# Patient Record
Sex: MALE | Race: WHITE | Employment: OTHER | URBAN - METROPOLITAN AREA
[De-identification: names, ages, dates, MRNs, and addresses within clinical notes are randomized per-mention and may not be internally consistent; named-entity substitution may affect disease eponyms.]

---

## 2017-01-17 PROBLEM — Z90.2 HISTORY OF LOBECTOMY OF LUNG: Status: ACTIVE | Noted: 2017-01-17

## 2017-01-31 PROCEDURE — 87077 CULTURE AEROBIC IDENTIFY: CPT | Performed by: UROLOGY

## 2017-01-31 PROCEDURE — 87186 SC STD MICRODIL/AGAR DIL: CPT | Performed by: UROLOGY

## 2017-01-31 PROCEDURE — 87086 URINE CULTURE/COLONY COUNT: CPT | Performed by: UROLOGY

## 2017-02-14 PROCEDURE — 87186 SC STD MICRODIL/AGAR DIL: CPT | Performed by: UROLOGY

## 2017-02-14 PROCEDURE — 87077 CULTURE AEROBIC IDENTIFY: CPT | Performed by: UROLOGY

## 2017-02-14 PROCEDURE — 87086 URINE CULTURE/COLONY COUNT: CPT | Performed by: UROLOGY

## 2017-05-09 PROCEDURE — 87086 URINE CULTURE/COLONY COUNT: CPT | Performed by: UROLOGY

## 2017-05-09 PROCEDURE — 87186 SC STD MICRODIL/AGAR DIL: CPT | Performed by: UROLOGY

## 2017-05-09 PROCEDURE — 87077 CULTURE AEROBIC IDENTIFY: CPT | Performed by: UROLOGY

## 2017-05-25 PROBLEM — N20.0 KIDNEY STONE ON LEFT SIDE: Status: ACTIVE | Noted: 2017-05-25

## 2017-06-01 PROCEDURE — 87086 URINE CULTURE/COLONY COUNT: CPT | Performed by: UROLOGY

## 2017-06-01 PROCEDURE — 87077 CULTURE AEROBIC IDENTIFY: CPT | Performed by: UROLOGY

## 2017-06-01 PROCEDURE — 87186 SC STD MICRODIL/AGAR DIL: CPT | Performed by: UROLOGY

## 2017-06-21 PROBLEM — N20.1 LEFT URETERAL STONE: Status: ACTIVE | Noted: 2017-06-21

## 2017-06-21 PROBLEM — N40.1 BENIGN NON-NODULAR PROSTATIC HYPERPLASIA WITH LOWER URINARY TRACT SYMPTOMS: Status: ACTIVE | Noted: 2017-06-21

## 2017-06-21 PROBLEM — R33.9 INCOMPLETE BLADDER EMPTYING: Status: ACTIVE | Noted: 2017-06-21

## 2017-07-31 PROCEDURE — 82365 CALCULUS SPECTROSCOPY: CPT | Performed by: UROLOGY

## 2017-09-18 PROCEDURE — 82436 ASSAY OF URINE CHLORIDE: CPT | Performed by: UROLOGY

## 2017-09-18 PROCEDURE — 84392 ASSAY OF URINE SULFATE: CPT | Performed by: UROLOGY

## 2017-09-18 PROCEDURE — 82507 ASSAY OF CITRATE: CPT | Performed by: UROLOGY

## 2017-09-18 PROCEDURE — 83945 ASSAY OF OXALATE: CPT | Performed by: UROLOGY

## 2017-09-18 PROCEDURE — 82340 ASSAY OF CALCIUM IN URINE: CPT | Performed by: UROLOGY

## 2018-01-18 PROBLEM — N20.1 LEFT URETERAL STONE: Status: RESOLVED | Noted: 2017-06-21 | Resolved: 2018-01-18

## 2018-08-29 PROBLEM — Z87.442 PERSONAL HISTORY OF KIDNEY STONES: Status: ACTIVE | Noted: 2018-08-29

## 2018-08-29 PROBLEM — N52.02 CORPORO-VENOUS OCCLUSIVE ERECTILE DYSFUNCTION: Status: ACTIVE | Noted: 2018-08-29

## 2019-02-25 PROBLEM — Z85.118 HISTORY OF LUNG CANCER: Status: ACTIVE | Noted: 2019-02-25

## 2019-02-25 PROCEDURE — 81001 URINALYSIS AUTO W/SCOPE: CPT | Performed by: INTERNAL MEDICINE

## 2019-02-28 RX ORDER — SODIUM CHLORIDE, SODIUM LACTATE, POTASSIUM CHLORIDE, CALCIUM CHLORIDE 600; 310; 30; 20 MG/100ML; MG/100ML; MG/100ML; MG/100ML
INJECTION, SOLUTION INTRAVENOUS CONTINUOUS
Status: CANCELLED | OUTPATIENT
Start: 2019-02-28

## 2019-03-02 PROCEDURE — 87086 URINE CULTURE/COLONY COUNT: CPT | Performed by: INTERNAL MEDICINE

## 2019-03-02 PROCEDURE — 81001 URINALYSIS AUTO W/SCOPE: CPT | Performed by: INTERNAL MEDICINE

## 2019-03-04 ENCOUNTER — ANESTHESIA EVENT (OUTPATIENT)
Dept: ENDOSCOPY | Facility: HOSPITAL | Age: 74
End: 2019-03-04

## 2019-03-04 ENCOUNTER — HOSPITAL ENCOUNTER (OUTPATIENT)
Facility: HOSPITAL | Age: 74
Setting detail: HOSPITAL OUTPATIENT SURGERY
Discharge: HOME | End: 2019-03-04
Attending: INTERNAL MEDICINE | Admitting: INTERNAL MEDICINE
Payer: MEDICARE

## 2019-03-04 ENCOUNTER — ANESTHESIA (OUTPATIENT)
Dept: ENDOSCOPY | Facility: HOSPITAL | Age: 74
End: 2019-03-04

## 2019-03-04 VITALS
OXYGEN SATURATION: 99 % | HEIGHT: 73 IN | WEIGHT: 195 LBS | DIASTOLIC BLOOD PRESSURE: 96 MMHG | RESPIRATION RATE: 18 BRPM | TEMPERATURE: 99 F | SYSTOLIC BLOOD PRESSURE: 145 MMHG | HEART RATE: 96 BPM | BODY MASS INDEX: 25.84 KG/M2

## 2019-03-04 DIAGNOSIS — R93.3 ABNORMAL CT SCAN, COLON: ICD-10-CM

## 2019-03-04 PROCEDURE — 0DBP8ZX EXCISION OF RECTUM, VIA NATURAL OR ARTIFICIAL OPENING ENDOSCOPIC, DIAGNOSTIC: ICD-10-PCS | Performed by: INTERNAL MEDICINE

## 2019-03-04 PROCEDURE — 88305 TISSUE EXAM BY PATHOLOGIST: CPT | Performed by: INTERNAL MEDICINE

## 2019-03-04 PROCEDURE — 0DBH8ZX EXCISION OF CECUM, VIA NATURAL OR ARTIFICIAL OPENING ENDOSCOPIC, DIAGNOSTIC: ICD-10-PCS | Performed by: INTERNAL MEDICINE

## 2019-03-04 RX ORDER — NALOXONE HYDROCHLORIDE 0.4 MG/ML
80 INJECTION, SOLUTION INTRAMUSCULAR; INTRAVENOUS; SUBCUTANEOUS AS NEEDED
Status: DISCONTINUED | OUTPATIENT
Start: 2019-03-04 | End: 2019-03-04

## 2019-03-04 RX ORDER — SODIUM CHLORIDE, SODIUM LACTATE, POTASSIUM CHLORIDE, CALCIUM CHLORIDE 600; 310; 30; 20 MG/100ML; MG/100ML; MG/100ML; MG/100ML
INJECTION, SOLUTION INTRAVENOUS CONTINUOUS
Status: DISCONTINUED | OUTPATIENT
Start: 2019-03-04 | End: 2019-03-04

## 2019-03-04 RX ORDER — HYDROMORPHONE HYDROCHLORIDE 1 MG/ML
0.4 INJECTION, SOLUTION INTRAMUSCULAR; INTRAVENOUS; SUBCUTANEOUS EVERY 5 MIN PRN
Status: DISCONTINUED | OUTPATIENT
Start: 2019-03-04 | End: 2019-03-04

## 2019-03-04 NOTE — OPERATIVE REPORT
[id number]  Mamadou Uzair Pandamon  5/28/1945  3/4/2019      Preoperative Diagnosis:   Cecal mass seen on CT scan, no prior history of colonoscopy.   Postoperative Diagnosis: Large cecal mass, significant left-sided diverticulosis, rectal polyps  Procedure Perform

## 2019-03-04 NOTE — ANESTHESIA PREPROCEDURE EVALUATION
PRE-OP EVALUATION    Patient Name: Tom Reilly    Pre-op Diagnosis: Abnormal CT scan, colon [R93.3]    Procedure(s):  COLONOSCOPY    Surgeon(s) and Role:     Kirk Villaseñor MD - Primary    Pre-op vitals reviewed.   Temp: 98.5 °F (36.9 °C)  Pulse: 9 prostatic hyperplasia with lower urinary tract symptoms     Incomplete bladder emptying     Personal history of kidney stones     Corporo-venous occlusive erectile dysfunction     History of lung cancer          Past Surgical History:   Procedure Lateralit Component Value Date     02/25/2019    K 4.30 02/25/2019     02/25/2019    CO2 25.2 02/25/2019    BUN 15 02/25/2019    CREATSERUM 0.84 02/25/2019    GLU 97 02/25/2019            Airway      Mallampati: I  Mouth opening: 3 FB  TM distance: 4 -

## 2019-03-04 NOTE — H&P
[address] Department of  Gastroenterology  Update Health History :       Marietta Randi  male   Gilberto Skiff, MD     [id number]  5/28/1945 Primary Care Physician  Heidy Cannon MD        HPI :  Patient with a history of lung cancer. SURGICAL HISTORY  08/23/2017    cysto, stent removal dr Yesenia Cano   • OTHER SURGICAL HISTORY  08/30/2017    cysto, left urs, stone extraction, laser litho, stent    • OTHER SURGICAL HISTORY  09/07/2017    cysto, stent removal dr Celena Ruth    • [address] murmur   GI: good BS's and no masses, HSM or tenderness   RECTAL: Exam not done. EXTREMITIES: no cyanosis,   NEURO: Oriented times three,  grossly intact      Assessment :  As above    Plan:   Colonoscopy.   The risks and benefits of the procedure were [facility]

## 2019-03-04 NOTE — ANESTHESIA POSTPROCEDURE EVALUATION
[id number] [facility] Patient Status:  Hospital Outpatient Surgery   Age/Gender 68year old male MRN [id number]   Location [facility]. Attending Sukh Haines MD   Hosp Day # 0 PCP Terrell Jaime MD       Anesthesia Po

## 2019-04-02 PROBLEM — C18.2 MALIGNANT NEOPLASM OF ASCENDING COLON (HCC): Status: ACTIVE | Noted: 2019-04-02

## 2019-04-17 PROBLEM — N21.0 BLADDER STONE: Status: ACTIVE | Noted: 2019-04-17

## 2019-04-17 PROBLEM — I70.0 THORACIC AORTIC ATHEROSCLEROSIS (HCC): Status: ACTIVE | Noted: 2019-04-17

## 2019-07-23 PROBLEM — Z51.11 CHEMOTHERAPY MANAGEMENT, ENCOUNTER FOR: Status: ACTIVE | Noted: 2019-07-23

## 2019-07-23 PROBLEM — T45.1X5A CHEMOTHERAPY-INDUCED NEUTROPENIA (HCC): Status: ACTIVE | Noted: 2019-07-23

## 2019-07-23 PROBLEM — D70.1 CHEMOTHERAPY-INDUCED NEUTROPENIA (HCC): Status: ACTIVE | Noted: 2019-07-23

## 2020-02-28 PROBLEM — G62.0 NEUROPATHY DUE TO CHEMOTHERAPEUTIC DRUG (HCC): Status: ACTIVE | Noted: 2020-02-28

## 2020-02-28 PROBLEM — J84.10 CALCIFIED GRANULOMA OF LUNG (HCC): Status: ACTIVE | Noted: 2020-02-28

## 2020-02-28 PROBLEM — T45.1X5A NEUROPATHY DUE TO CHEMOTHERAPEUTIC DRUG (HCC): Status: ACTIVE | Noted: 2020-02-28

## 2020-05-06 PROBLEM — I70.0 CALCIFICATION OF AORTA (HCC): Status: ACTIVE | Noted: 2020-05-06

## 2021-07-02 PROBLEM — D70.1 CHEMOTHERAPY-INDUCED NEUTROPENIA (HCC): Status: RESOLVED | Noted: 2019-07-23 | Resolved: 2021-07-02

## 2021-07-02 PROBLEM — D69.2 SENILE PURPURA (HCC): Status: ACTIVE | Noted: 2021-07-02

## 2021-07-02 PROBLEM — Z51.11 CHEMOTHERAPY MANAGEMENT, ENCOUNTER FOR: Status: RESOLVED | Noted: 2019-07-23 | Resolved: 2021-07-02

## 2021-07-02 PROBLEM — T45.1X5A CHEMOTHERAPY-INDUCED NEUTROPENIA (HCC): Status: RESOLVED | Noted: 2019-07-23 | Resolved: 2021-07-02

## 2022-03-11 PROBLEM — M17.0 BILATERAL PRIMARY OSTEOARTHRITIS OF KNEE: Status: ACTIVE | Noted: 2022-03-11

## 2023-04-04 ENCOUNTER — LAB ENCOUNTER (OUTPATIENT)
Dept: LAB | Age: 78
End: 2023-04-04
Attending: ORTHOPAEDIC SURGERY
Payer: MEDICARE

## 2023-04-04 DIAGNOSIS — Z01.818 PREOP TESTING: ICD-10-CM

## 2023-04-04 LAB
ANTIBODY SCREEN: NEGATIVE
RH BLOOD TYPE: NEGATIVE
RH BLOOD TYPE: NEGATIVE

## 2023-04-04 PROCEDURE — 36415 COLL VENOUS BLD VENIPUNCTURE: CPT

## 2023-04-04 PROCEDURE — 87641 MR-STAPH DNA AMP PROBE: CPT

## 2023-04-04 PROCEDURE — 86901 BLOOD TYPING SEROLOGIC RH(D): CPT

## 2023-04-04 PROCEDURE — 86850 RBC ANTIBODY SCREEN: CPT

## 2023-04-04 PROCEDURE — 86900 BLOOD TYPING SEROLOGIC ABO: CPT

## 2023-04-05 LAB — MRSA DNA SPEC QL NAA+PROBE: NEGATIVE

## 2023-04-06 ENCOUNTER — HOSPITAL ENCOUNTER (OUTPATIENT)
Facility: HOSPITAL | Age: 78
Discharge: HOME HEALTH | End: 2023-04-07
Attending: ORTHOPAEDIC SURGERY | Admitting: ORTHOPAEDIC SURGERY
Payer: MEDICARE

## 2023-04-06 ENCOUNTER — APPOINTMENT (OUTPATIENT)
Dept: GENERAL RADIOLOGY | Facility: HOSPITAL | Age: 78
End: 2023-04-06
Attending: PHYSICIAN ASSISTANT
Payer: MEDICARE

## 2023-04-06 ENCOUNTER — ANESTHESIA (OUTPATIENT)
Dept: SURGERY | Facility: HOSPITAL | Age: 78
End: 2023-04-06
Payer: MEDICARE

## 2023-04-06 ENCOUNTER — ANESTHESIA EVENT (OUTPATIENT)
Dept: SURGERY | Facility: HOSPITAL | Age: 78
End: 2023-04-06
Payer: MEDICARE

## 2023-04-06 DIAGNOSIS — Z01.818 PREOP TESTING: Primary | ICD-10-CM

## 2023-04-06 PROBLEM — Z96.652 S/P TOTAL KNEE ARTHROPLASTY, LEFT: Status: ACTIVE | Noted: 2023-04-06

## 2023-04-06 PROCEDURE — 88311 DECALCIFY TISSUE: CPT | Performed by: ORTHOPAEDIC SURGERY

## 2023-04-06 PROCEDURE — 0SRD069 REPLACEMENT OF LEFT KNEE JOINT WITH OXIDIZED ZIRCONIUM ON POLYETHYLENE SYNTHETIC SUBSTITUTE, CEMENTED, OPEN APPROACH: ICD-10-PCS | Performed by: ORTHOPAEDIC SURGERY

## 2023-04-06 PROCEDURE — 88305 TISSUE EXAM BY PATHOLOGIST: CPT | Performed by: ORTHOPAEDIC SURGERY

## 2023-04-06 PROCEDURE — 73560 X-RAY EXAM OF KNEE 1 OR 2: CPT | Performed by: PHYSICIAN ASSISTANT

## 2023-04-06 PROCEDURE — 3E0T3BZ INTRODUCTION OF ANESTHETIC AGENT INTO PERIPHERAL NERVES AND PLEXI, PERCUTANEOUS APPROACH: ICD-10-PCS | Performed by: ANESTHESIOLOGY

## 2023-04-06 PROCEDURE — 64447 NJX AA&/STRD FEMORAL NRV IMG: CPT | Performed by: ORTHOPAEDIC SURGERY

## 2023-04-06 DEVICE — PSN ALL POLY PAT PLY 38MM: Type: IMPLANTABLE DEVICE | Site: KNEE | Status: FUNCTIONAL

## 2023-04-06 DEVICE — PSN TIB STM 5 DEG SZ F L: Type: IMPLANTABLE DEVICE | Site: KNEE | Status: FUNCTIONAL

## 2023-04-06 DEVICE — PSN MC VE ASF L 10MM 8-9/CD: Type: IMPLANTABLE DEVICE | Site: KNEE | Status: FUNCTIONAL

## 2023-04-06 DEVICE — BIOMET BC R 1X40 US: Type: IMPLANTABLE DEVICE | Site: KNEE | Status: FUNCTIONAL

## 2023-04-06 RX ORDER — HYDROMORPHONE HYDROCHLORIDE 1 MG/ML
0.2 INJECTION, SOLUTION INTRAMUSCULAR; INTRAVENOUS; SUBCUTANEOUS EVERY 5 MIN PRN
Status: DISCONTINUED | OUTPATIENT
Start: 2023-04-06 | End: 2023-04-06 | Stop reason: HOSPADM

## 2023-04-06 RX ORDER — FAMOTIDINE 10 MG/ML
20 INJECTION, SOLUTION INTRAVENOUS 2 TIMES DAILY
Status: DISCONTINUED | OUTPATIENT
Start: 2023-04-06 | End: 2023-04-07

## 2023-04-06 RX ORDER — HYDROCODONE BITARTRATE AND ACETAMINOPHEN 10; 325 MG/1; MG/1
1 TABLET ORAL EVERY 6 HOURS PRN
Status: DISCONTINUED | OUTPATIENT
Start: 2023-04-06 | End: 2023-04-07

## 2023-04-06 RX ORDER — DEXAMETHASONE SODIUM PHOSPHATE 10 MG/ML
INJECTION, SOLUTION INTRAMUSCULAR; INTRAVENOUS AS NEEDED
Status: DISCONTINUED | OUTPATIENT
Start: 2023-04-06 | End: 2023-04-06 | Stop reason: SURG

## 2023-04-06 RX ORDER — METOCLOPRAMIDE HYDROCHLORIDE 5 MG/ML
10 INJECTION INTRAMUSCULAR; INTRAVENOUS EVERY 8 HOURS PRN
Status: DISCONTINUED | OUTPATIENT
Start: 2023-04-06 | End: 2023-04-07

## 2023-04-06 RX ORDER — BUPIVACAINE HYDROCHLORIDE 7.5 MG/ML
INJECTION, SOLUTION INTRASPINAL AS NEEDED
Status: DISCONTINUED | OUTPATIENT
Start: 2023-04-06 | End: 2023-04-06 | Stop reason: SURG

## 2023-04-06 RX ORDER — DIPHENHYDRAMINE HYDROCHLORIDE 50 MG/ML
25 INJECTION INTRAMUSCULAR; INTRAVENOUS ONCE AS NEEDED
Status: ACTIVE | OUTPATIENT
Start: 2023-04-06 | End: 2023-04-06

## 2023-04-06 RX ORDER — MORPHINE SULFATE 4 MG/ML
2 INJECTION, SOLUTION INTRAMUSCULAR; INTRAVENOUS EVERY 10 MIN PRN
Status: DISCONTINUED | OUTPATIENT
Start: 2023-04-06 | End: 2023-04-06 | Stop reason: HOSPADM

## 2023-04-06 RX ORDER — SODIUM CHLORIDE, SODIUM LACTATE, POTASSIUM CHLORIDE, CALCIUM CHLORIDE 600; 310; 30; 20 MG/100ML; MG/100ML; MG/100ML; MG/100ML
INJECTION, SOLUTION INTRAVENOUS CONTINUOUS
Status: DISCONTINUED | OUTPATIENT
Start: 2023-04-06 | End: 2023-04-06 | Stop reason: HOSPADM

## 2023-04-06 RX ORDER — FAMOTIDINE 20 MG/1
20 TABLET, FILM COATED ORAL 2 TIMES DAILY
Status: DISCONTINUED | OUTPATIENT
Start: 2023-04-06 | End: 2023-04-07

## 2023-04-06 RX ORDER — HYDROMORPHONE HYDROCHLORIDE 1 MG/ML
0.6 INJECTION, SOLUTION INTRAMUSCULAR; INTRAVENOUS; SUBCUTANEOUS EVERY 5 MIN PRN
Status: DISCONTINUED | OUTPATIENT
Start: 2023-04-06 | End: 2023-04-06 | Stop reason: HOSPADM

## 2023-04-06 RX ORDER — TRANEXAMIC ACID 10 MG/ML
INJECTION, SOLUTION INTRAVENOUS AS NEEDED
Status: DISCONTINUED | OUTPATIENT
Start: 2023-04-06 | End: 2023-04-06 | Stop reason: SURG

## 2023-04-06 RX ORDER — LIDOCAINE HYDROCHLORIDE 10 MG/ML
INJECTION, SOLUTION EPIDURAL; INFILTRATION; INTRACAUDAL; PERINEURAL AS NEEDED
Status: DISCONTINUED | OUTPATIENT
Start: 2023-04-06 | End: 2023-04-06 | Stop reason: SURG

## 2023-04-06 RX ORDER — SODIUM CHLORIDE, SODIUM LACTATE, POTASSIUM CHLORIDE, CALCIUM CHLORIDE 600; 310; 30; 20 MG/100ML; MG/100ML; MG/100ML; MG/100ML
INJECTION, SOLUTION INTRAVENOUS CONTINUOUS
Status: DISCONTINUED | OUTPATIENT
Start: 2023-04-06 | End: 2023-04-07

## 2023-04-06 RX ORDER — ATORVASTATIN CALCIUM 10 MG/1
10 TABLET, FILM COATED ORAL NIGHTLY
Status: DISCONTINUED | OUTPATIENT
Start: 2023-04-06 | End: 2023-04-07

## 2023-04-06 RX ORDER — HYDROMORPHONE HYDROCHLORIDE 1 MG/ML
0.4 INJECTION, SOLUTION INTRAMUSCULAR; INTRAVENOUS; SUBCUTANEOUS EVERY 5 MIN PRN
Status: DISCONTINUED | OUTPATIENT
Start: 2023-04-06 | End: 2023-04-06 | Stop reason: HOSPADM

## 2023-04-06 RX ORDER — SODIUM PHOSPHATE, DIBASIC AND SODIUM PHOSPHATE, MONOBASIC 7; 19 G/133ML; G/133ML
1 ENEMA RECTAL ONCE AS NEEDED
Status: DISCONTINUED | OUTPATIENT
Start: 2023-04-06 | End: 2023-04-07

## 2023-04-06 RX ORDER — MORPHINE SULFATE 10 MG/ML
6 INJECTION, SOLUTION INTRAMUSCULAR; INTRAVENOUS EVERY 10 MIN PRN
Status: DISCONTINUED | OUTPATIENT
Start: 2023-04-06 | End: 2023-04-06 | Stop reason: HOSPADM

## 2023-04-06 RX ORDER — NALOXONE HYDROCHLORIDE 0.4 MG/ML
80 INJECTION, SOLUTION INTRAMUSCULAR; INTRAVENOUS; SUBCUTANEOUS AS NEEDED
Status: DISCONTINUED | OUTPATIENT
Start: 2023-04-06 | End: 2023-04-06 | Stop reason: HOSPADM

## 2023-04-06 RX ORDER — POLYETHYLENE GLYCOL 3350 17 G/17G
17 POWDER, FOR SOLUTION ORAL DAILY PRN
Status: DISCONTINUED | OUTPATIENT
Start: 2023-04-06 | End: 2023-04-07

## 2023-04-06 RX ORDER — DIPHENHYDRAMINE HYDROCHLORIDE 50 MG/ML
12.5 INJECTION INTRAMUSCULAR; INTRAVENOUS EVERY 4 HOURS PRN
Status: DISCONTINUED | OUTPATIENT
Start: 2023-04-06 | End: 2023-04-07

## 2023-04-06 RX ORDER — MIDAZOLAM HYDROCHLORIDE 1 MG/ML
INJECTION INTRAMUSCULAR; INTRAVENOUS AS NEEDED
Status: DISCONTINUED | OUTPATIENT
Start: 2023-04-06 | End: 2023-04-06 | Stop reason: SURG

## 2023-04-06 RX ORDER — DIPHENHYDRAMINE HCL 25 MG
25 CAPSULE ORAL EVERY 4 HOURS PRN
Status: DISCONTINUED | OUTPATIENT
Start: 2023-04-06 | End: 2023-04-07

## 2023-04-06 RX ORDER — METOCLOPRAMIDE HYDROCHLORIDE 5 MG/ML
10 INJECTION INTRAMUSCULAR; INTRAVENOUS EVERY 8 HOURS PRN
Status: DISCONTINUED | OUTPATIENT
Start: 2023-04-06 | End: 2023-04-06 | Stop reason: HOSPADM

## 2023-04-06 RX ORDER — FINASTERIDE 5 MG/1
5 TABLET, FILM COATED ORAL NIGHTLY
Status: DISCONTINUED | OUTPATIENT
Start: 2023-04-06 | End: 2023-04-07

## 2023-04-06 RX ORDER — CEFAZOLIN SODIUM/WATER 2 G/20 ML
2 SYRINGE (ML) INTRAVENOUS ONCE
Status: DISCONTINUED | OUTPATIENT
Start: 2023-04-06 | End: 2023-04-06 | Stop reason: HOSPADM

## 2023-04-06 RX ORDER — DOCUSATE SODIUM 100 MG/1
100 CAPSULE, LIQUID FILLED ORAL 2 TIMES DAILY
Status: DISCONTINUED | OUTPATIENT
Start: 2023-04-06 | End: 2023-04-07

## 2023-04-06 RX ORDER — CEFAZOLIN SODIUM/WATER 2 G/20 ML
SYRINGE (ML) INTRAVENOUS AS NEEDED
Status: DISCONTINUED | OUTPATIENT
Start: 2023-04-06 | End: 2023-04-06 | Stop reason: SURG

## 2023-04-06 RX ORDER — ONDANSETRON 2 MG/ML
4 INJECTION INTRAMUSCULAR; INTRAVENOUS EVERY 6 HOURS PRN
Status: DISCONTINUED | OUTPATIENT
Start: 2023-04-06 | End: 2023-04-07

## 2023-04-06 RX ORDER — LISINOPRIL 10 MG/1
10 TABLET ORAL DAILY
Status: DISCONTINUED | OUTPATIENT
Start: 2023-04-07 | End: 2023-04-07

## 2023-04-06 RX ORDER — TAMSULOSIN HYDROCHLORIDE 0.4 MG/1
0.4 CAPSULE ORAL 2 TIMES DAILY
Status: DISCONTINUED | OUTPATIENT
Start: 2023-04-06 | End: 2023-04-07

## 2023-04-06 RX ORDER — ACETAMINOPHEN 500 MG
1000 TABLET ORAL ONCE
Status: COMPLETED | OUTPATIENT
Start: 2023-04-06 | End: 2023-04-06

## 2023-04-06 RX ORDER — TRANEXAMIC ACID 650 MG/1
1300 TABLET ORAL ONCE
Status: DISCONTINUED | OUTPATIENT
Start: 2023-04-06 | End: 2023-04-06 | Stop reason: HOSPADM

## 2023-04-06 RX ORDER — METOPROLOL SUCCINATE 25 MG/1
25 TABLET, EXTENDED RELEASE ORAL DAILY
Status: DISCONTINUED | OUTPATIENT
Start: 2023-04-07 | End: 2023-04-07

## 2023-04-06 RX ORDER — ROPIVACAINE HYDROCHLORIDE 5 MG/ML
INJECTION, SOLUTION EPIDURAL; INFILTRATION; PERINEURAL AS NEEDED
Status: DISCONTINUED | OUTPATIENT
Start: 2023-04-06 | End: 2023-04-06 | Stop reason: SURG

## 2023-04-06 RX ORDER — HYDRALAZINE HYDROCHLORIDE 20 MG/ML
5 INJECTION INTRAMUSCULAR; INTRAVENOUS EVERY 6 HOURS PRN
Status: DISCONTINUED | OUTPATIENT
Start: 2023-04-06 | End: 2023-04-07

## 2023-04-06 RX ORDER — MORPHINE SULFATE 4 MG/ML
4 INJECTION, SOLUTION INTRAMUSCULAR; INTRAVENOUS EVERY 10 MIN PRN
Status: DISCONTINUED | OUTPATIENT
Start: 2023-04-06 | End: 2023-04-06 | Stop reason: HOSPADM

## 2023-04-06 RX ORDER — BISACODYL 10 MG
10 SUPPOSITORY, RECTAL RECTAL
Status: DISCONTINUED | OUTPATIENT
Start: 2023-04-06 | End: 2023-04-07

## 2023-04-06 RX ORDER — ONDANSETRON 2 MG/ML
4 INJECTION INTRAMUSCULAR; INTRAVENOUS EVERY 6 HOURS PRN
Status: DISCONTINUED | OUTPATIENT
Start: 2023-04-06 | End: 2023-04-06 | Stop reason: HOSPADM

## 2023-04-06 RX ORDER — LISINOPRIL 10 MG/1
10 TABLET ORAL ONCE
Status: COMPLETED | OUTPATIENT
Start: 2023-04-06 | End: 2023-04-06

## 2023-04-06 RX ORDER — SENNOSIDES 8.6 MG
17.2 TABLET ORAL NIGHTLY
Status: DISCONTINUED | OUTPATIENT
Start: 2023-04-06 | End: 2023-04-07

## 2023-04-06 RX ORDER — CEFAZOLIN SODIUM/WATER 2 G/20 ML
2 SYRINGE (ML) INTRAVENOUS EVERY 8 HOURS
Status: COMPLETED | OUTPATIENT
Start: 2023-04-06 | End: 2023-04-07

## 2023-04-06 RX ADMIN — ROPIVACAINE HYDROCHLORIDE 25 ML: 5 INJECTION, SOLUTION EPIDURAL; INFILTRATION; PERINEURAL at 14:28:00

## 2023-04-06 RX ADMIN — SODIUM CHLORIDE, SODIUM LACTATE, POTASSIUM CHLORIDE, CALCIUM CHLORIDE: 600; 310; 30; 20 INJECTION, SOLUTION INTRAVENOUS at 14:36:00

## 2023-04-06 RX ADMIN — MIDAZOLAM HYDROCHLORIDE 2 MG: 1 INJECTION INTRAMUSCULAR; INTRAVENOUS at 14:24:00

## 2023-04-06 RX ADMIN — TRANEXAMIC ACID 1000 MG: 10 INJECTION, SOLUTION INTRAVENOUS at 15:07:00

## 2023-04-06 RX ADMIN — CEFAZOLIN SODIUM/WATER 2 G: 2 G/20 ML SYRINGE (ML) INTRAVENOUS at 14:51:00

## 2023-04-06 RX ADMIN — LIDOCAINE HYDROCHLORIDE 2 ML: 10 INJECTION, SOLUTION EPIDURAL; INFILTRATION; INTRACAUDAL; PERINEURAL at 14:43:00

## 2023-04-06 RX ADMIN — BUPIVACAINE HYDROCHLORIDE 1.5 ML: 7.5 INJECTION, SOLUTION INTRASPINAL at 14:45:00

## 2023-04-06 RX ADMIN — LIDOCAINE HYDROCHLORIDE 5 ML: 10 INJECTION, SOLUTION EPIDURAL; INFILTRATION; INTRACAUDAL; PERINEURAL at 14:26:00

## 2023-04-06 RX ADMIN — LIDOCAINE HYDROCHLORIDE 3 ML: 10 INJECTION, SOLUTION EPIDURAL; INFILTRATION; INTRACAUDAL; PERINEURAL at 14:47:00

## 2023-04-06 RX ADMIN — DEXAMETHASONE SODIUM PHOSPHATE 4 MG: 10 INJECTION, SOLUTION INTRAMUSCULAR; INTRAVENOUS at 14:28:00

## 2023-04-06 RX ADMIN — SODIUM CHLORIDE, SODIUM LACTATE, POTASSIUM CHLORIDE, CALCIUM CHLORIDE: 600; 310; 30; 20 INJECTION, SOLUTION INTRAVENOUS at 16:25:00

## 2023-04-06 NOTE — PLAN OF CARE
Problem: Patient Centered Care  Goal: Patient preferences are identified and integrated in the patient's plan of care  Description: Interventions:  - What would you like us to know as we care for you?   - Provide timely, complete, and accurate information to patient/family  - Incorporate patient and family knowledge, values, beliefs, and cultural backgrounds into the planning and delivery of care  - Encourage patient/family to participate in care and decision-making at the level they choose  - Honor patient and family perspectives and choices  Outcome: Progressing

## 2023-04-06 NOTE — DISCHARGE INSTRUCTIONS
Keep dressing intact for 1 week, changing only if >50% saturated  Change dressing in 1 week if not previously changed  May shower with water-proof dressing intact  Keep leg elevated when not ambulating, no pillow directly under knee, keep leg straight with foot higher than knee. Use pain medication as provided  Use over the counter stool softener daily while taking pain medication - Colace 100mg twice a day and Senokot 17.2mg every night. If no bowel movement in 2 days, obtain Magnesium Citrate and take as directed.   Continue Xarelto 20mg daily per regular dosing  Contact office in 24 hours if you have not heard from  for RN and physical therapy home visits  Follow-up in office in 2 weeks as scheduled      [address]  Phone: [phone]

## 2023-04-06 NOTE — ANESTHESIA PROCEDURE NOTES
Spinal Block    Date/Time: 4/6/2023 2:39 PM    Performed by: José Miguel Mobley DO  Authorized by: José Miguel Mobley DO      General Information and Staff    Start Time:  4/6/2023 2:39 PM  End Time:  4/6/2023 2:47 PM  Anesthesiologist:  José Miguel Mobley DO  Performed by:   Anesthesiologist  Patient Location:  OR  Reason for Block: surgical anesthesia    Preanesthetic Checklist: patient identified, IV checked, risks and benefits discussed, monitors and equipment checked, pre-op evaluation, timeout performed, anesthesia consent and sterile technique used      Procedure Details    Patient Position:  Sitting  Prep: ChloraPrep    Monitoring:  Cardiac monitor  Approach:  Midline  Location:  L3-4  Injection Technique:  Single-shot    Needle    Needle Type:  Pencil-tip  Needle Gauge:  24 G  Needle Length:  3.5 in    Assessment    Sensory Level:  T10  Events: clear CSF, CSF aspirated, well tolerated and blood negative      Additional Comments

## 2023-04-06 NOTE — ANESTHESIA PROCEDURE NOTES
Peripheral Block    Date/Time: 4/6/2023 2:23 PM    Performed by: Bhavna Dawn DO  Authorized by: Bhavna Dawn DO      General Information and Staff    Start Time:  4/6/2023 2:23 PM  End Time:  4/6/2023 2:28 PM  Anesthesiologist:  Bhavna Dawn DO  Performed by:   Anesthesiologist  Patient Location:  PACU      Site Identification: real time ultrasound guided and image stored and retrievable      Reason for Block: at surgeon's request and post-op pain management    Preanesthetic Checklist: 2 patient identifers, IV checked, risks and benefits discussed, monitors and equipment checked, pre-op evaluation, timeout performed, anesthesia consent, sterile technique used, no prohibitive neurological deficits and no local skin infection at insertion site      Procedure Details    Patient Position:  Supine  Prep: ChloraPrep    Monitoring:  Cardiac monitor  Block Type:  Saphenous  Injection Technique:  Single-shot    Needle    Needle Type:  Short-bevel  Needle Gauge:  22 G  Needle Length:  90 mm  Needle Localization:  Ultrasound guidance  Reason for Ultrasound Use: appropriate spread of the medication was noted in real time and no ultrasound evidence of intravascular and/or intraneural injection            Assessment    Injection Assessment:  Good spread noted, incremental injection, low pressure, local visualized surrounding nerve on ultrasound, negative aspiration for heme and no pain on injection  Paresthesia Pain:  None  Heart Rate Change: No        Medications  4/6/2023 2:23 PM      Additional Comments

## 2023-04-07 ENCOUNTER — APPOINTMENT (OUTPATIENT)
Dept: GENERAL RADIOLOGY | Facility: HOSPITAL | Age: 78
End: 2023-04-07
Attending: INTERNAL MEDICINE
Payer: MEDICARE

## 2023-04-07 VITALS
SYSTOLIC BLOOD PRESSURE: 158 MMHG | HEART RATE: 105 BPM | OXYGEN SATURATION: 96 % | TEMPERATURE: 98 F | BODY MASS INDEX: 26.51 KG/M2 | DIASTOLIC BLOOD PRESSURE: 112 MMHG | HEIGHT: 73 IN | WEIGHT: 200 LBS | RESPIRATION RATE: 17 BRPM

## 2023-04-07 LAB
Q-T INTERVAL: 332 MS
QRS DURATION: 96 MS
QTC CALCULATION (BEZET): 444 MS
R AXIS: 49 DEGREES
T AXIS: -86 DEGREES
VENTRICULAR RATE: 108 BPM

## 2023-04-07 PROCEDURE — 93010 ELECTROCARDIOGRAM REPORT: CPT | Performed by: INTERNAL MEDICINE

## 2023-04-07 PROCEDURE — 94640 AIRWAY INHALATION TREATMENT: CPT

## 2023-04-07 PROCEDURE — 97110 THERAPEUTIC EXERCISES: CPT

## 2023-04-07 PROCEDURE — 97162 PT EVAL MOD COMPLEX 30 MIN: CPT

## 2023-04-07 PROCEDURE — 71045 X-RAY EXAM CHEST 1 VIEW: CPT | Performed by: INTERNAL MEDICINE

## 2023-04-07 PROCEDURE — 93005 ELECTROCARDIOGRAM TRACING: CPT

## 2023-04-07 PROCEDURE — 97530 THERAPEUTIC ACTIVITIES: CPT

## 2023-04-07 PROCEDURE — 97116 GAIT TRAINING THERAPY: CPT

## 2023-04-07 PROCEDURE — 97165 OT EVAL LOW COMPLEX 30 MIN: CPT

## 2023-04-07 RX ORDER — METOPROLOL TARTRATE 5 MG/5ML
5 INJECTION INTRAVENOUS ONCE
Status: COMPLETED | OUTPATIENT
Start: 2023-04-07 | End: 2023-04-07

## 2023-04-07 RX ORDER — FINASTERIDE 5 MG/1
5 TABLET, FILM COATED ORAL NIGHTLY
Status: SHIPPED | COMMUNITY
Start: 2023-04-07

## 2023-04-07 RX ORDER — TAMSULOSIN HYDROCHLORIDE 0.4 MG/1
0.4 CAPSULE ORAL 2 TIMES DAILY
Qty: 180 CAPSULE | Refills: 3 | Status: SHIPPED | COMMUNITY
Start: 2023-04-07

## 2023-04-07 NOTE — CM/SW NOTE
[facility]  Referral sent to [facility]. F2F entered. Sammy Brown.  Beckie Whittaker RN, BSN  Nurse   [phone]

## 2023-04-07 NOTE — DISCHARGE SUMMARY
General Medicine Discharge Summary     Patient ID:  Lori Nina  69year old  5/28/1945    Admit date: 4/6/2023    Discharge date and time: 4/7/2023    Attending Physician: Nakia Lopez MD     Consults: IP CONSULT TO CASE MANAGEMENT  IP CONSULT TO HOSPITALIST  IP CONSULT TO RESPIRATORY CARE  IP CONSULT TO SOCIAL WORK    Primary Care Physician: Daphne Gold MD     Reason for admission: left total knee arthroplasty    Risk For Readmission: Low    Discharge Diagnoses: Primary osteoarthritis of left knee  S/P total knee arthroplasty, left  See Additional Discharge Diagnoses in Hospital Course    Discharged Condition: good    Follow-up with labs/images appointments:   Patient should follow-up with primary care provider in 2 weeks  Should follow-up with surgeon in 2 weeks    Exam  Gen: No acute distress  Pulm: Lungs clear, normal respiratory effort  CV: Heart with regular rate and rhythm  Abd: Abdomen soft,   EXT: Left knee surgical bandage clean dry and intact    HPI:    Patient is a 69year old male with PMH paroxysmal atrial fibrillation on anticoagulation, BPH, hypertension, hyperlipidemia who presents for elective left total knee arthroplasty, medical service was consulted for postoperative medical management patient was seen after surgery in the PACU he reports no pain at the knee although did have a spinal block and local anesthesia. He denies chest pain or shortness of breath no cough fevers or chills he does report he is a past smoker and is currently on nasal cannula. no abdominal pain no nausea, no other current complaints.    [facility] Course:   Patient was admitted after left total knee arthroplasty for observation did well after surgery however was complicated by brief course of atrial fibrillation with rapid ventricular response I suspect this was because he did not take his metoprolol the day of surgery, patient was checked with an EKG chest x-ray and was given 5 mg of IV Lopressor with significant improvement in heart rate symptoms resolved he was discharged home with follow-up with Ortho as previously prescribed. Operative Procedures: Procedure(s) (LRB):  Left total knee arthroplasty (Left)     Imaging: XR CHEST AP PORTABLE  (CPT=71045)    Result Date: 4/7/2023  CONCLUSION:  1. No prior imaging available for comparison. 2. Borderline cardiomegaly. 3. Atherosclerosis. 4. Scarring/atelectasis. 5. Postop changes right hemithorax. 6. Hyperinflation. 7. Demineralization. 8. Scoliosis. 9. Osteoarthritis. Dictated by (CST): Lizeth Snyder MD on 4/07/2023 at 12:51 PM     Finalized by (CST): Lizeth Snyder MD on 4/07/2023 at 12:55 PM          XR KNEE (1 OR 2 VIEWS), LEFT (CPT=73560)    Result Date: 4/6/2023  CONCLUSION:  Left knee arthroplasty with intact components in alignment. Negative for radiographic evidence of acute complication. Dictated by (CST): Mg Camacho MD on 4/06/2023 at 5:27 PM     Finalized by (CST): Mg Camacho MD on 4/06/2023 at 5:28 PM            Disposition: home    Activity: As directed by Ortho  Diet: cardiac diet  Wound Care: keep wound clean and dry  Code Status: Full Code  O2: none    Home Medication Changes: See list below    Med list     Medication List      CHANGE how you take these medications    atorvastatin 10 MG Tabs  Commonly known as: Lipitor  TAKE ONE TABLET BY MOUTH ONE TIME DAILY  What changed: when to take this        CONTINUE taking these medications    cyanocobalamin 1000 MCG Tabs  Commonly known as: Vitamin B12     finasteride 5 MG Tabs  Commonly known as: Proscar     lisinopril 10 MG Tabs  Commonly known as: Zestril  Take 1 tablet (10 mg total) by mouth daily. metoprolol succinate ER 25 MG Tb24  Commonly known as: Toprol XL  Take 1 tablet (25 mg total) by mouth daily. rivaroxaban 20 MG Tabs  Commonly known as: Xarelto  Take 1 tablet (20 mg total) by mouth daily with food.      tamsulosin 0.4 MG Caps  Commonly known as: Flomax     Vitamin D 400 units Tabs            FU   Follow-up Information     Mayelin Cruz MD Follow up in 2 week(s). Specialty: Internal Medicine  Contact information:  [address] [facility] [address] [phone]             Julian Matos MD Follow up in 2 week(s). Specialty: SURGERY, ORTHOPEDIC  Why: as scheduled  Contact information:  [address] [address]  LOIS Smalls 51 instructions: Other Discharge Instructions:       Keep dressing intact for 1 week, changing only if >50% saturated  Change dressing in 1 week if not previously changed  May shower with water-proof dressing intact  Keep leg elevated when not ambulating, no pillow directly under knee, keep leg straight with foot higher than knee. Use pain medication as provided  Use over the counter stool softener daily while taking pain medication - Colace 100mg twice a day and Senokot 17.2mg every night. If no bowel movement in 2 days, obtain Magnesium Citrate and take as directed. Continue Xarelto 20mg daily per regular dosing  Contact office in 24 hours if you have not heard from  for RN and physical therapy home visits  Follow-up in office in 2 weeks as scheduled          I reconciled current and discharge medications on day of discharge, discussed changes with patient and noted changes above.        Total Time Coordinating Care: Greater than 30 minutes    Patient had opportunity to ask questions and state understand and agree with therapeutic plan as outlined    Thank You,    Catarina Juarez DO   Hospitalist with [facility]

## 2023-04-07 NOTE — PLAN OF CARE
Post-op day 1. Dressing in place; clean, dry, & intact. Monitoring vital signs; MD notified on high HR & high diastolic BP - IV metoprolol given as per MD order - Vitals stable at this time. No other acute changes noted throughout shift. Receiving IV fluids per MD order. Tolerating diet. Voiding in urinal; urinary output noted. Patient is on RA. SCDs for DVT prophylaxis. Norco given for pain management. Up 1 with walker. Encouraged frequent ambulation and use of incentive spirometer. Fall precautions maintained- bed alarm on, bed locked in lowest position, call light and personal belongings within reach, non-skid socks in place to bilateral feet. Frequent rounding by nursing staff. Plan is to go home medically cleared. Patient cleared by internal medicine, ortho surgery, PT/OT, and social work. Going home with daughter. Verified that pain medications are at patient's pharmacy. IV removed, discharge education provided, patient sent home with all personal belongings, medications, scripts, and discharge instructions. Addressed additional questions.          Problem: Patient Centered Care  Goal: Patient preferences are identified and integrated in the patient's plan of care  Description: Interventions:  - What would you like us to know as we care for you?   - Provide timely, complete, and accurate information to patient/family  - Incorporate patient and family knowledge, values, beliefs, and cultural backgrounds into the planning and delivery of care  - Encourage patient/family to participate in care and decision-making at the level they choose  - Honor patient and family perspectives and choices  Outcome: Adequate for Discharge     Problem: Patient/Family Goals  Goal: Patient/Family Long Term Goal  Description: Patient's Long Term Goal: Pain management    Interventions:  - Pharm & non pharm methods  - See additional Care Plan goals for specific interventions  Outcome: Adequate for Discharge  Goal: Patient/Family Short Term Goal  Description: Patient's Short Term Goal: Go home medically cleared    Interventions:   - adhere to treatment plans  - See additional Care Plan goals for specific interventions  Outcome: Adequate for Discharge     Problem: PAIN - ADULT  Goal: Verbalizes/displays adequate comfort level or patient's stated pain goal  Description: INTERVENTIONS:  - Encourage pt to monitor pain and request assistance  - Assess pain using appropriate pain scale  - Administer analgesics based on type and severity of pain and evaluate response  - Implement non-pharmacological measures as appropriate and evaluate response  - Consider cultural and social influences on pain and pain management  - Manage/alleviate anxiety  - Utilize distraction and/or relaxation techniques  - Monitor for opioid side effects  - Notify MD/LIP if interventions unsuccessful or patient reports new pain  - Anticipate increased pain with activity and pre-medicate as appropriate  Outcome: Adequate for Discharge     Problem: RISK FOR INFECTION - ADULT  Goal: Absence of fever/infection during anticipated neutropenic period  Description: INTERVENTIONS  - Monitor WBC  - Administer growth factors as ordered  - Implement neutropenic guidelines  Outcome: Adequate for Discharge     Problem: SAFETY ADULT - FALL  Goal: Free from fall injury  Description: INTERVENTIONS:  - Assess pt frequently for physical needs  - Identify cognitive and physical deficits and behaviors that affect risk of falls.   - [place] fall precautions as indicated by assessment.  - Educate pt/family on patient safety including physical limitations  - Instruct pt to call for assistance with activity based on assessment  - Modify environment to reduce risk of injury  - Provide assistive devices as appropriate  - Consider OT/PT consult to assist with strengthening/mobility  - Encourage toileting schedule  Outcome: Adequate for Discharge     Problem: DISCHARGE PLANNING  Goal: Discharge to home or other facility with appropriate resources  Description: INTERVENTIONS:  - Identify barriers to discharge w/pt and caregiver  - Include patient/family/discharge partner in discharge planning  - Arrange for needed discharge resources and transportation as appropriate  - Identify discharge learning needs (meds, wound care, etc)  - Arrange for interpreters to assist at discharge as needed  - Consider post-discharge preferences of patient/family/discharge partner  - Complete POLST form as appropriate  - Assess patient's ability to be responsible for managing their own health  - Refer to Case Management Department for coordinating discharge planning if the patient needs post-hospital services based on physician/LIP order or complex needs related to functional status, cognitive ability or social support system  Outcome: Adequate for Discharge     Problem: Altered Communication/Language Barrier  Goal: Patient/Family is able to understand and participate in their care  Description: Interventions:  - Assess communication ability and preferred communication style  - Implement communication aides and strategies  - Use visual cues when possible  - Listen attentively, be patient, do not interrupt  - Minimize distractions  - Allow time for understanding and response  - Establish method for patient to ask for assistance (call light)  - Provide an  as needed  - Communicate barriers and strategies to overcome with those who interact with patient  Outcome: Adequate for Discharge

## 2023-04-07 NOTE — PLAN OF CARE
Patient alert and oriented X4. Post-op day 1. Dressing in place to L knee. WBAT. Voiding with urinal. SCD's for DVT prophylaxis. Remote tele in place. Xarelto to begin this evening. Pain management with norco prn. General diet. Encouraged to cough and deep breathe with incentive spirometer and patient is compliant. Fall precautions in place including bed in lowest position, call light and personal belongings within reach, non-skid socks. Frequent rounding by nursing staff. Plan for home today. Problem: Patient Centered Care  Goal: Patient preferences are identified and integrated in the patient's plan of care  Description: Interventions:  - What would you like us to know as we care for you?  My wife is at home with me  - Provide timely, complete, and accurate information to patient/family  - Incorporate patient and family knowledge, values, beliefs, and cultural backgrounds into the planning and delivery of care  - Encourage patient/family to participate in care and decision-making at the level they choose  - Honor patient and family perspectives and choices  Outcome: Progressing     Problem: PAIN - ADULT  Goal: Verbalizes/displays adequate comfort level or patient's stated pain goal  Description: INTERVENTIONS:  - Encourage pt to monitor pain and request assistance  - Assess pain using appropriate pain scale  - Administer analgesics based on type and severity of pain and evaluate response  - Implement non-pharmacological measures as appropriate and evaluate response  - Consider cultural and social influences on pain and pain management  - Manage/alleviate anxiety  - Utilize distraction and/or relaxation techniques  - Monitor for opioid side effects  - Notify MD/LIP if interventions unsuccessful or patient reports new pain  - Anticipate increased pain with activity and pre-medicate as appropriate  Outcome: Progressing     Problem: RISK FOR INFECTION - ADULT  Goal: Absence of fever/infection during anticipated neutropenic period  Description: INTERVENTIONS  - Monitor WBC  - Administer growth factors as ordered  - Implement neutropenic guidelines  Outcome: Progressing     Problem: SAFETY ADULT - FALL  Goal: Free from fall injury  Description: INTERVENTIONS:  - Assess pt frequently for physical needs  - Identify cognitive and physical deficits and behaviors that affect risk of falls.   - [place] fall precautions as indicated by assessment.  - Educate pt/family on patient safety including physical limitations  - Instruct pt to call for assistance with activity based on assessment  - Modify environment to reduce risk of injury  - Provide assistive devices as appropriate  - Consider OT/PT consult to assist with strengthening/mobility  - Encourage toileting schedule  Outcome: Progressing     Problem: DISCHARGE PLANNING  Goal: Discharge to home or other facility with appropriate resources  Description: INTERVENTIONS:  - Identify barriers to discharge w/pt and caregiver  - Include patient/family/discharge partner in discharge planning  - Arrange for needed discharge resources and transportation as appropriate  - Identify discharge learning needs (meds, wound care, etc)  - Arrange for interpreters to assist at discharge as needed  - Consider post-discharge preferences of patient/family/discharge partner  - Complete POLST form as appropriate  - Assess patient's ability to be responsible for managing their own health  - Refer to Case Management Department for coordinating discharge planning if the patient needs post-hospital services based on physician/LIP order or complex needs related to functional status, cognitive ability or social support system  Outcome: Progressing

## 2023-04-07 NOTE — CM/SW NOTE
04/07/23 1500   Discharge disposition   Expected discharge disposition Home-Health   Post Acute Care Provider [address]  ([address])   Discharge transportation Private car     Notified by patient's RN that patient is cleared for discharge today. [facility] reserved in aidin. Notified Caio Oliva, with [facility] agency, of patient's discharge for today and confirmed patient is all set up. [address]  Phone: [phone]    Plan: Home with [facility] today.      Camille Dietrihc, RN, BSN

## 2023-04-12 NOTE — CM/SW NOTE
ROGER received a call from [place], [place] with Dr. Sanju Pham stating the pt. Has not been started with [facility] at this time. ROGER spoke with supervisor Kailee Zamorano at [facility] and the pt. Will be seen tomorrow 4/13. There was a staffing issue with a nurse getting sick and being out. [facility] has been in contact with the pt's wife regarding start of care. ROGER left a message with [facility] with the above information.     [phone]

## 2024-08-26 ENCOUNTER — HOSPITAL ENCOUNTER (OUTPATIENT)
Facility: HOSPITAL | Age: 79
Setting detail: HOSPITAL OUTPATIENT SURGERY
Discharge: HOME | End: 2024-08-26
Attending: UROLOGY | Admitting: UROLOGY
Payer: MEDICARE

## 2024-08-26 ENCOUNTER — ANESTHESIA EVENT (OUTPATIENT)
Dept: SURGERY | Facility: HOSPITAL | Age: 79
End: 2024-08-26
Payer: MEDICARE

## 2024-08-26 ENCOUNTER — ANESTHESIA (OUTPATIENT)
Dept: SURGERY | Facility: HOSPITAL | Age: 79
End: 2024-08-26
Payer: MEDICARE

## 2024-08-26 VITALS
DIASTOLIC BLOOD PRESSURE: 93 MMHG | WEIGHT: 194 LBS | BODY MASS INDEX: 25.71 KG/M2 | TEMPERATURE: 98 F | HEIGHT: 73 IN | HEART RATE: 77 BPM | SYSTOLIC BLOOD PRESSURE: 153 MMHG | OXYGEN SATURATION: 92 % | RESPIRATION RATE: 16 BRPM

## 2024-08-26 PROCEDURE — 0T7D8DZ DILATION OF URETHRA WITH INTRALUMINAL DEVICE, VIA NATURAL OR ARTIFICIAL OPENING ENDOSCOPIC: ICD-10-PCS | Performed by: UROLOGY

## 2024-08-26 DEVICE — UROLIFT 2 IMPLANT CARTRIDGE
Type: IMPLANTABLE DEVICE | Site: BLADDER | Status: FUNCTIONAL
Brand: UROLIFT

## 2024-08-26 DEVICE — UROLIFT 2 IMPLANT CARTRIDGE HANDLE KIT
Type: IMPLANTABLE DEVICE | Site: BLADDER | Status: FUNCTIONAL
Brand: UROLIFT

## 2024-08-26 RX ORDER — MORPHINE SULFATE 4 MG/ML
4 INJECTION, SOLUTION INTRAMUSCULAR; INTRAVENOUS EVERY 10 MIN PRN
Status: DISCONTINUED | OUTPATIENT
Start: 2024-08-26 | End: 2024-08-26

## 2024-08-26 RX ORDER — MORPHINE SULFATE 4 MG/ML
2 INJECTION, SOLUTION INTRAMUSCULAR; INTRAVENOUS EVERY 10 MIN PRN
Status: DISCONTINUED | OUTPATIENT
Start: 2024-08-26 | End: 2024-08-26

## 2024-08-26 RX ORDER — METOPROLOL TARTRATE 25 MG/1
25 TABLET, FILM COATED ORAL ONCE AS NEEDED
Status: DISCONTINUED | OUTPATIENT
Start: 2024-08-26 | End: 2024-08-26 | Stop reason: HOSPADM

## 2024-08-26 RX ORDER — LIDOCAINE HYDROCHLORIDE 20 MG/ML
JELLY TOPICAL AS NEEDED
Status: DISCONTINUED | OUTPATIENT
Start: 2024-08-26 | End: 2024-08-26 | Stop reason: HOSPADM

## 2024-08-26 RX ORDER — ONDANSETRON 2 MG/ML
INJECTION INTRAMUSCULAR; INTRAVENOUS AS NEEDED
Status: DISCONTINUED | OUTPATIENT
Start: 2024-08-26 | End: 2024-08-26 | Stop reason: SURG

## 2024-08-26 RX ORDER — METOCLOPRAMIDE HYDROCHLORIDE 5 MG/ML
10 INJECTION INTRAMUSCULAR; INTRAVENOUS EVERY 8 HOURS PRN
Status: DISCONTINUED | OUTPATIENT
Start: 2024-08-26 | End: 2024-08-26

## 2024-08-26 RX ORDER — ALBUTEROL SULFATE 0.83 MG/ML
2.5 SOLUTION RESPIRATORY (INHALATION) AS NEEDED
Status: DISCONTINUED | OUTPATIENT
Start: 2024-08-26 | End: 2024-08-26

## 2024-08-26 RX ORDER — MORPHINE SULFATE 10 MG/ML
6 INJECTION, SOLUTION INTRAMUSCULAR; INTRAVENOUS EVERY 10 MIN PRN
Status: DISCONTINUED | OUTPATIENT
Start: 2024-08-26 | End: 2024-08-26

## 2024-08-26 RX ORDER — DEXAMETHASONE SODIUM PHOSPHATE 4 MG/ML
VIAL (ML) INJECTION AS NEEDED
Status: DISCONTINUED | OUTPATIENT
Start: 2024-08-26 | End: 2024-08-26 | Stop reason: SURG

## 2024-08-26 RX ORDER — HYDROMORPHONE HYDROCHLORIDE 1 MG/ML
0.6 INJECTION, SOLUTION INTRAMUSCULAR; INTRAVENOUS; SUBCUTANEOUS EVERY 5 MIN PRN
Status: DISCONTINUED | OUTPATIENT
Start: 2024-08-26 | End: 2024-08-26

## 2024-08-26 RX ORDER — LIDOCAINE HYDROCHLORIDE 10 MG/ML
INJECTION, SOLUTION EPIDURAL; INFILTRATION; INTRACAUDAL; PERINEURAL AS NEEDED
Status: DISCONTINUED | OUTPATIENT
Start: 2024-08-26 | End: 2024-08-26 | Stop reason: SURG

## 2024-08-26 RX ORDER — ACETAMINOPHEN 500 MG
1000 TABLET ORAL ONCE
Status: COMPLETED | OUTPATIENT
Start: 2024-08-26 | End: 2024-08-26

## 2024-08-26 RX ORDER — SODIUM CHLORIDE, SODIUM LACTATE, POTASSIUM CHLORIDE, CALCIUM CHLORIDE 600; 310; 30; 20 MG/100ML; MG/100ML; MG/100ML; MG/100ML
INJECTION, SOLUTION INTRAVENOUS CONTINUOUS
Status: DISCONTINUED | OUTPATIENT
Start: 2024-08-26 | End: 2024-08-26

## 2024-08-26 RX ORDER — HYDRALAZINE HYDROCHLORIDE 20 MG/ML
5 INJECTION INTRAMUSCULAR; INTRAVENOUS EVERY 10 MIN PRN
Status: DISCONTINUED | OUTPATIENT
Start: 2024-08-26 | End: 2024-08-26

## 2024-08-26 RX ORDER — HYDROMORPHONE HYDROCHLORIDE 1 MG/ML
0.2 INJECTION, SOLUTION INTRAMUSCULAR; INTRAVENOUS; SUBCUTANEOUS EVERY 5 MIN PRN
Status: DISCONTINUED | OUTPATIENT
Start: 2024-08-26 | End: 2024-08-26

## 2024-08-26 RX ORDER — LABETALOL HYDROCHLORIDE 5 MG/ML
5 INJECTION, SOLUTION INTRAVENOUS EVERY 5 MIN PRN
Status: DISCONTINUED | OUTPATIENT
Start: 2024-08-26 | End: 2024-08-26

## 2024-08-26 RX ORDER — ONDANSETRON 2 MG/ML
4 INJECTION INTRAMUSCULAR; INTRAVENOUS EVERY 6 HOURS PRN
Status: DISCONTINUED | OUTPATIENT
Start: 2024-08-26 | End: 2024-08-26

## 2024-08-26 RX ORDER — HYDROMORPHONE HYDROCHLORIDE 1 MG/ML
0.4 INJECTION, SOLUTION INTRAMUSCULAR; INTRAVENOUS; SUBCUTANEOUS EVERY 5 MIN PRN
Status: DISCONTINUED | OUTPATIENT
Start: 2024-08-26 | End: 2024-08-26

## 2024-08-26 RX ORDER — NALOXONE HYDROCHLORIDE 0.4 MG/ML
0.08 INJECTION, SOLUTION INTRAMUSCULAR; INTRAVENOUS; SUBCUTANEOUS AS NEEDED
Status: DISCONTINUED | OUTPATIENT
Start: 2024-08-26 | End: 2024-08-26

## 2024-08-26 RX ADMIN — DEXAMETHASONE SODIUM PHOSPHATE 4 MG: 4 MG/ML VIAL (ML) INJECTION at 10:42:00

## 2024-08-26 RX ADMIN — SODIUM CHLORIDE, SODIUM LACTATE, POTASSIUM CHLORIDE, CALCIUM CHLORIDE: 600; 310; 30; 20 INJECTION, SOLUTION INTRAVENOUS at 11:07:00

## 2024-08-26 RX ADMIN — LIDOCAINE HYDROCHLORIDE 40 MG: 10 INJECTION, SOLUTION EPIDURAL; INFILTRATION; INTRACAUDAL; PERINEURAL at 10:35:00

## 2024-08-26 RX ADMIN — ONDANSETRON 4 MG: 2 INJECTION INTRAMUSCULAR; INTRAVENOUS at 10:56:00

## 2024-08-26 NOTE — ANESTHESIA PREPROCEDURE EVALUATION
Anesthesia PreOp Note    HPI:     Emilio Underwood is a 79 year old male who presents for preoperative consultation requested by: Cecilia Aguillon MD    Date of Surgery: 8/26/2024    Procedure(s):  Cystoscopy, urolift procedure  Indication: Bening prostatic hyperplasia with obstruction    Relevant Problems   No relevant active problems       NPO:  Last Liquid Consumption Date: 08/25/24  Last Liquid Consumption Time: 2200  Last Solid Consumption Date: 08/25/24  Last Solid Consumption Time: 2200  Last Liquid Consumption Date: 08/25/24          History Review:  Patient Active Problem List    Diagnosis Date Noted    S/P total knee arthroplasty, left 04/06/2023    Bilateral primary osteoarthritis of knee 03/11/2022    Senile purpura (HCC) 07/02/2021    Calcification of aorta (HCC) 05/06/2020    Neuropathy due to chemotherapeutic drug (HCC) 02/28/2020    Calcified granuloma of lung (HCC) 02/28/2020    Bladder stone 04/17/2019    Thoracic aortic atherosclerosis (HCC) 04/17/2019    Malignant neoplasm of ascending colon (HCC) 04/02/2019    History of lung cancer 02/25/2019    Personal history of kidney stones 08/29/2018    Corporo-venous occlusive erectile dysfunction 08/29/2018    Benign non-nodular prostatic hyperplasia with lower urinary tract symptoms 06/21/2017    Incomplete bladder emptying 06/21/2017    Kidney stone on left side 05/25/2017    History of lobectomy of lung 01/17/2017    PAD (peripheral artery disease) (HCC) 08/24/2015    HTN (hypertension) 01/19/2015    Elevated PSA     Dyslipidemia 05/01/2013    Coronary atherosclerosis of native coronary artery 05/01/2013    Lung cancer (HCC) 10/27/2011    PAF (paroxysmal atrial fibrillation) (HCC) 07/06/2011    BPH        Past Medical History:    Arrhythmia    BPH    BPH (benign prostatic hyperplasia)    Calcification of aorta (HCC)    Calculus of kidney    lithotripsy    Cataract    Chemotherapy management, encounter for    Coronary atherosclerosis    Coronary  atherosclerosis of native coronary artery    CAD on cardiac cath 6/15/11 with 60-70% proximal OM, 30-40% prox LAD, 60% prox diagonal, and prox RCA up to 40    Dyslipidemia    Elevated PSA    negative prostate biopsy result     Gross hematuria    Hearing impairment    [place]    High blood pressure    High cholesterol    History of lobectomy of lung    s/p right upper lobectomy on 6/20/2011     HTN (hypertension)    hyperlipidemia    Incontinence    hurst catheter due to obstruction    Kidney stone on left side    Lung cancer (HCC)    s/p rul lobectomy    Malignant neoplasm of ascending colon (HCC)    Neuropathy    numbness of hands and feet from cheotx    Osteoarthritis    PAF (paroxysmal atrial fibrillation) (HCC)    Personal history of antineoplastic chemotherapy    Problems with swallowing    upper dentures    Visual impairment    readers       Past Surgical History:   Procedure Laterality Date    Cataract      Cholecystectomy      Colectomy      Colonoscopy N/A 03/04/2019    Procedure: COLONOSCOPY;  Surgeon: Fernando Paige MD;  Location:  ENDOSCOPY    Fracture surgery      left hip    Other surgical history  09/23/2008    [facility], Dr. Glez    Other surgical history  10/06/2008    [facility] Dr. Glez    Other surgical history  01/12/2009    [facility]/ Dr. Glez    Other surgical history  04/26/2010    [facility]/US Dr. Glez    Other surgical history  02/14/2017    cystoscopy Dr Glez     Other surgical history  07/31/2017    Cysto, stent removal - Dr Pereira    Other surgical history      cysto, left urs, stone extraction, litho, stent Dr Pereira    Other surgical history  08/23/2017    cysto, stent removal dr pereira    Other surgical history  08/30/2017    cysto, left urs, stone extraction, laser litho, stent     Other surgical history  09/07/2017    cysto, stent removal dr beltran     Other surgical history  11/22/2017    FLOW     Other surgical history  09/21/2020    Cystoscopy- Dr. Quevedo     Other surgical  history  11/21/2020    Cystoscopy/Lithotripsy - Dr. Quevedo     Other surgical history  03/21/2022    Cystoscopy Dr. Quevedo    Removal of lung,lobectomy      RUL lobectomy       Medications Prior to Admission   Medication Sig Dispense Refill Last Dose    rivaroxaban (XARELTO) 20 MG Oral Tab Take 1 tablet (20 mg total) by mouth daily with food. 90 tablet 1 8/22/2024    metoprolol succinate ER 25 MG Oral Tablet 24 Hr Take 1 tablet (25 mg total) by mouth daily. (Patient taking differently: Take 2 tablets (50 mg total) by mouth daily.) 90 tablet 1 8/26/2024 at 0800    lisinopril 10 MG Oral Tab Take 1 tablet (10 mg total) by mouth daily. 90 tablet 3 8/25/2024 at 0800    ATORVASTATIN 10 MG Oral Tab TAKE ONE TABLET BY MOUTH ONE TIME DAILY  (Patient taking differently: Take 1 tablet (10 mg total) by mouth nightly.) 90 tablet 2 8/25/2024 at 2100    Vitamin B-12 1000 MCG Oral Tab Take 1 tablet (1,000 mcg total) by mouth daily.   Past Week    Cholecalciferol (VITAMIN D) 400 UNITS Oral Tab Take 2.5 tablets (1,000 Units total) by mouth daily.   Past Week     Current Facility-Administered Medications Ordered in Epic   Medication Dose Route Frequency Provider Last Rate Last Admin    lactated ringers infusion   Intravenous Continuous Cecilia Aguillon MD 20 mL/hr at 08/26/24 0911 New Bag at 08/26/24 0911    metoprolol tartrate (Lopressor) tab 25 mg  25 mg Oral Once PRN Cecilia Aguillon MD        ceFAZolin (Ancef) 2g in 10mL IV syringe premix  2 g Intravenous Once Cecilia Aguillon MD         No current [facility]-ordered outpatient medications on file.       No Known Allergies    Family History   Problem Relation Age of Onset    Heart Disorder Father     Other (Other) Mother         peripheral vascular disease     Social History     Socioeconomic History    Marital status:    Tobacco Use    Smoking status: Former     Current packs/day: 0.00     Average packs/day: 0.5 packs/day for 50.0 years (25.0 ttl pk-yrs)     Types:  Cigarettes     Start date: 1961     Quit date: 2011     Years since quittin.1    Smokeless tobacco: Never   Substance and Sexual Activity    Alcohol use: Not Currently     Alcohol/week: 0.0 - 1.0 standard drinks of alcohol    Drug use: No       Available pre-op labs reviewed.             Vital Signs:  Body mass index is 25.6 kg/m².   height is 1.854 m (6' 1\") and weight is 88 kg (194 lb). His oral temperature is 98.3 °F (36.8 °C). His blood pressure is 151/99 (abnormal) and his pulse is 84. His respiration is 16 and oxygen saturation is 96%.   Vitals:    08/15/24 1326 24 0908   BP:  (!) 151/99   Pulse:  84   Resp:  16   Temp:  98.3 °F (36.8 °C)   TempSrc:  Oral   SpO2:  96%   Weight: 88.5 kg (195 lb) 88 kg (194 lb)   Height: 1.854 m (6' 1\")         Anesthesia Evaluation     Patient summary reviewed and Nursing notes reviewed    Airway   Mallampati: II  TM distance: >3 FB  Neck ROM: limited  Dental    (+) upper dentures    Pulmonary    (+) COPD mild, rhonchi    ROS comment: Former smoker, H/O lung Ca, s/p resection  Cardiovascular   Exercise tolerance: poor  (+) hypertension, CAD, dysrhythmias (Afib), weak pulses    Rhythm: regular  Rate: normal    Neuro/Psych      GI/Hepatic/Renal      Comments: H/O colon ca, s/p resection    Endo/Other    Abdominal                  Anesthesia Plan:   ASA:  3  Plan:   MAC  Informed Consent Plan and Risks Discussed With:  Patient  Discussed plan with:  Attending      I have informed Emilio Mosquedamon and/or legal guardian or family member of the nature of the anesthetic plan, benefits, risks including possible dental damage if relevant, major complications, and any alternative forms of anesthetic management.   All of the patient's questions were answered to the best of my ability. The patient desires the anesthetic management as planned.  Brian Sharma MD  2024 9:24 AM  Present on Admission:  **None**

## 2024-08-26 NOTE — H&P
[facility]  part of [facility]     History & Physical    Emilio Underwood Patient Status:  Hospital Outpatient Surgery    1945 MRN [id number]   Location [facility] PRE OP RECOVERY Attending Cecilia Aguillon MD   Hosp Day # 0 PCP Alexx Cruz MD     History of Present Illness:  Emilio Underwood is a(n) 79 year old male. He has gone into retention and has an enlarged prostate.    History:  Past Medical History:    Arrhythmia    BPH    BPH (benign prostatic hyperplasia)    Calcification of aorta (HCC)    Calculus of kidney    lithotripsy    Cataract    Chemotherapy management, encounter for    Coronary atherosclerosis    Coronary atherosclerosis of native coronary artery    CAD on cardiac cath 6/15/11 with 60-70% proximal OM, 30-40% prox LAD, 60% prox diagonal, and prox RCA up to 40    Dyslipidemia    Elevated PSA    negative prostate biopsy result     Gross hematuria    Hearing impairment    [place]    High blood pressure    High cholesterol    History of lobectomy of lung    s/p right upper lobectomy on 2011     HTN (hypertension)    hyperlipidemia    Incontinence    hurst catheter due to obstruction    Kidney stone on left side    Lung cancer (HCC)    s/p rul lobectomy    Malignant neoplasm of ascending colon (HCC)    Neuropathy    numbness of hands and feet from cheotx    Osteoarthritis    PAF (paroxysmal atrial fibrillation) (HCC)    Personal history of antineoplastic chemotherapy    Problems with swallowing    upper dentures    Visual impairment    readers     Past Surgical History:   Procedure Laterality Date    Cataract      Cholecystectomy      Colectomy      Colonoscopy N/A 2019    Procedure: COLONOSCOPY;  Surgeon: Fernando Paige MD;  Location:  ENDOSCOPY    Fracture surgery      left hip    Other surgical history  2008    [facility], Dr. Glez    Other surgical history  10/06/2008    [facility] Dr. Glez    Other surgical history  2009     flow/US Dr. Glez    Other surgical history  04/26/2010    flow/US Dr. Glez    Other surgical history  02/14/2017    cystoscopy Dr Glez     Other surgical history  07/31/2017    Cysto, stent removal - Dr Pereira    Other surgical history      cysto, left urs, stone extraction, litho, stent Dr Pereira    Other surgical history  08/23/2017    cysto, stent removal dr pereira    Other surgical history  08/30/2017    cysto, left urs, stone extraction, laser litho, stent     Other surgical history  09/07/2017    cysto, stent removal dr beltran     Other surgical history  11/22/2017    FLOW US    Other surgical history  09/21/2020    Cystoscopy- Dr. Quevedo     Other surgical history  11/21/2020    Cystoscopy/Lithotripsy - Dr. Quevedo     Other surgical history  03/21/2022    Cystoscopy Dr. Quevedo    Removal of lung,lobectomy      RUL lobectomy     Family History   Problem Relation Age of Onset    Heart Disorder Father     Other (Other) Mother         peripheral vascular disease      reports that he quit smoking about 13 years ago. His smoking use included cigarettes. He started smoking about 63 years ago. He has a 25 pack-year smoking history. He has never used smokeless tobacco. He reports that he does not currently use alcohol. He reports that he does not use drugs.    Allergies:  No Known Allergies    Home Medications:  No current outpatient medications on file.       Review of Systems:  Pertinent items are noted in HPI.    Physical Exam:  BP (!) 153/94 (BP Location: Left arm)   Pulse 84   Temp 98.3 °F (36.8 °C) (Oral)   Resp 16   Ht 6' 1\" (1.854 m)   Wt 194 lb (88 kg)   SpO2 96%   BMI 25.60 kg/m²     General Appearance: Alert, cooperative, no distress, appears stated age  Head: Normocephalic, without obvious abnormality, atraumatic  Lungs: Clear to auscultation bilaterally, respirations unlabored  Abdomen: Soft, non-tender, bowel sounds active all four quadrants, no masses,  no organomegaly  Genitalia: male without  lesions, discharge or tenderness    Laboratory Data:           Imaging:      Assessment:    BPH  Retention    Plan:  Cysto  UroLift    Cecilia Aguillon MD  8/26/2024  10:22 AM

## 2024-08-26 NOTE — OPERATIVE REPORT
[facility]  Brief Op Note    Emilio CHIQUITA Underwood Location: OR   [facility] [id number] MRN [id number]   Admission Date 8/26/2024 Operation Date 8/26/2024   Attending Physician Cecilia Aguillon MD Operating Physician Cecilia Aguillon MD     Pre-Operative Diagnosis: Bening prostatic hyperplasia with obstruction    Post-Operative Diagnosis: Same as above    Procedure Performed: Procedure(s):  Cystoscopy, urolift procedure    Anesthesia: YESICA Aguillon MD  8/26/2024  11:00 AM

## 2024-08-27 NOTE — OPERATIVE REPORT
[facility]    PATIENT'S NAME: TAN TY   ATTENDING PHYSICIAN: Cecilia Aguillon MD   OPERATING PHYSICIAN: Cecilia Aguillon MD   PATIENT ACCOUNT#:   [id number]    LOCATION:  [address]  MEDICAL RECORD #:   [id number]       YOB: 1945  ADMISSION DATE:       08/26/2024      OPERATION DATE:  08/26/2024    OPERATIVE REPORT      PREOPERATIVE DIAGNOSIS:  Benign prostatic hyperplasia and urinary retention.  POSTOPERATIVE DIAGNOSIS:  Benign prostatic hyperplasia and urinary retention.  PROCEDURE:  Cystoscopy, UroLift placement (7 implants).    ANESTHESIA:  Sedation.    SPECIMEN:  None.    ESTIMATED BLOOD LOSS:  None.    INDICATIONS:  Patient is a 79-year-old male with a history of having BPH and obstruction with retention.  He has had a TURP in the past.  He has gone into urinary retention.  He has been evaluated and has an IPSS score of 26, quality of life score of 6, obstructing apical tissue, prostate volume of 46 g.  He has been made aware of the risks of infection and bleeding.    OPERATIVE TECHNIQUE:  The patient was brought to the operative suite, administered a general anesthetic as well as an IV antibiotic, placed in lithotomy position, prepped and draped in sterile fashion.  UroLift scope was passed.  He was seen to have asymmetric tissue involving the prostate and obstructing apical tissue.  He had significantly trabeculated bladder with some diverticula.  At this time, UroLift implants were placed, 5 were seated, 7 attempted.  Attempts #2 and 6 resulted in pull throughs.  Upon increasing tension and angulation, the subsequent clips seated correctly.  There was adequate hemostasis.  There was a capacious prostatic fossa.  At the conclusion of the procedure, his bladder was partially full.  He will be given a void trial.    Dictated By Cecilia Aguillon MD  d: 08/26/2024 11:03:14  t: 08/26/2024 18:52:21  Job [id number]  BF/

## 2024-11-05 NOTE — ANESTHESIA POSTPROCEDURE EVALUATION
Patient: Emilio Underwood    Procedure Summary       Date: 08/26/24 Room / Location: [facility] MAIN OR [address] [place] [address] MAIN OR    Anesthesia Start: 1032 Anesthesia Stop: 1107    Procedure: Cystoscopy, urolift procedure (Penis) Diagnosis: (Bening prostatic hyperplasia with obstruction)    Surgeons: Cecilia Aguillon MD Anesthesiologist: Brian Sharma MD    Anesthesia Type: MAC ASA Status: 3            Anesthesia Type: MAC    Vitals Value Taken Time   /93 08/26/24 1134   Temp 97.6 °F (36.4 °C) 08/26/24 1124   Pulse 77 08/26/24 1134   Resp 16 08/26/24 1134   SpO2 92 % 08/26/24 1134       [facility] AN Post Evaluation:   Patient Evaluated in PACU  Patient Participation: complete - patient participated  Level of Consciousness: awake and alert  Pain Score: 0  Pain Management: adequate  Airway Patency:patent  Dental exam unchanged from preop  Yes    Nausea/Vomiting: none  Cardiovascular Status: acceptable  Respiratory Status: acceptable  Postoperative Hydration acceptable      Brian Sharma MD  8/26/2024 12:24 PM   Works 8 hrs a day five days a week

## (undated) DEVICE — TOTAL KNEE: Brand: MEDLINE INDUSTRIES, INC.

## (undated) DEVICE — FILTERLINE NASAL ADULT O2/CO2

## (undated) DEVICE — SOLUTION  .9 3000ML

## (undated) DEVICE — 3M™ IOBAN™ 2 ANTIMICROBIAL INCISE DRAPE 6651EZ: Brand: IOBAN™ 2

## (undated) DEVICE — SPINOCAN® 18 GA. X 3-1/2 IN. (90 MM) SPINAL NEEDLE: Brand: SPINOCAN®

## (undated) DEVICE — 1200CC GUARDIAN II: Brand: GUARDIAN

## (undated) DEVICE — SOLUTION IRRIG 3000ML 0.9% NACL FLX CONT

## (undated) DEVICE — Device

## (undated) DEVICE — 25MM FEMALE SCREW-Z

## (undated) DEVICE — 3M™ RED DOT™ MONITORING ELECTRODE WITH FOAM TAPE AND STICKY GEL, 50/BAG, 20/CASE, 72/PLT 2570: Brand: RED DOT™

## (undated) DEVICE — MEDI-VAC NON-CONDUCTIVE SUCTION TUBING: Brand: CARDINAL HEALTH

## (undated) DEVICE — BOWL CEMENT MIX QUICK-VAC

## (undated) DEVICE — 50MM MG 2.5 HEX HEAD PIN-DJ

## (undated) DEVICE — REM POLYHESIVE ADULT PATIENT RETURN ELECTRODE: Brand: VALLEYLAB

## (undated) DEVICE — DISPOSABLE DISTAL ATTACHMENT: Brand: DISPOSABLE DISTAL ATTACHMENT

## (undated) DEVICE — 60 ML SYRINGE LUER-LOCK TIP: Brand: MONOJECT

## (undated) DEVICE — TRI FLAT HEADED PIN-D

## (undated) DEVICE — BLADE SAW DEPUY

## (undated) DEVICE — BNDG COHESIVE W4INXL5YD TAN E

## (undated) DEVICE — SUT VICRYL 2-0 FS-1 J443H

## (undated) DEVICE — ENCORE® LATEX MICRO SIZE 7.5, STERILE LATEX POWDER-FREE SURGICAL GLOVE: Brand: ENCORE

## (undated) DEVICE — ENDOSCOPY PACK - LOWER: Brand: MEDLINE INDUSTRIES, INC.

## (undated) DEVICE — PACK CUSTOM CYSTO

## (undated) DEVICE — APPLICATOR CHLORAPREP 26ML

## (undated) DEVICE — KIT NAVIGATION KNEE NIVITRACK

## (undated) DEVICE — SOL NACL IRRIG 0.9% 1000ML BTL

## (undated) DEVICE — PIN FIX 80MM DIA3.2MM FLUTED

## (undated) DEVICE — PADDING 4YDX6IN CTTN STRL WBRL

## (undated) DEVICE — GAMMEX® PI HYBRID SIZE 7.5, STERILE POWDER-FREE SURGICAL GLOVE, POLYISOPRENE AND NEOPRENE BLEND: Brand: GAMMEX

## (undated) DEVICE — GAUZE SPONGES,12 PLY: Brand: CURITY

## (undated) DEVICE — TOWEL SURG OR 17X30IN BLUE

## (undated) DEVICE — SNARE CAPTIFLEX MICRO-OVL OLY

## (undated) DEVICE — HOOD: Brand: FLYTE

## (undated) DEVICE — WRAP COOLING KNEE W/ICE PILLOW

## (undated) DEVICE — UROLOGY DRAIN BAG

## (undated) DEVICE — CURAD OIL EMLUSION GAUZE 3X16

## (undated) DEVICE — FORCEP RADIAL JAW 4

## (undated) DEVICE — TRAP 4 CPTR CHMBR N EZ INLN

## (undated) DEVICE — PIN FIXATION 3.2X150MM FLUTED

## (undated) DEVICE — PROXIMATE SKIN STAPLERS (35 WIDE) CONTAINS 35 STAINLESS STEEL STAPLES (FIXED HEAD): Brand: PROXIMATE

## (undated) DEVICE — GAMMEX® PI HYBRID SIZE 7, STERILE POWDER-FREE SURGICAL GLOVE, POLYISOPRENE AND NEOPRENE BLEND: Brand: GAMMEX

## (undated) DEVICE — 3M™ TEGADERM™ TRANSPARENT FILM DRESSING, 1626W, 4 IN X 4-3/4 IN (10 CM X 12 CM), 50 EACH/CARTON, 4 CARTON/CASE: Brand: 3M™ TEGADERM™

## (undated) DEVICE — SOLUTION IRRIG 1000ML ST H2O AQUALITE PLAS

## (undated) DEVICE — Device: Brand: DEFENDO AIR/WATER/SUCTION AND BIOPSY VALVE

## (undated) DEVICE — DRAPE SHEET LARGE 76X55

## (undated) DEVICE — DRESSING AQUACEL AG SP 3.5X10